# Patient Record
Sex: FEMALE | Race: WHITE | NOT HISPANIC OR LATINO | Employment: UNEMPLOYED | URBAN - METROPOLITAN AREA
[De-identification: names, ages, dates, MRNs, and addresses within clinical notes are randomized per-mention and may not be internally consistent; named-entity substitution may affect disease eponyms.]

---
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
## 2022-04-25 ENCOUNTER — TELEPHONE (OUTPATIENT)
Dept: PEDIATRICS | Facility: CLINIC | Age: 22
End: 2022-04-25
Payer: COMMERCIAL

## 2022-04-25 NOTE — TELEPHONE ENCOUNTER
----- Message from Amandeep Millan LPN sent at 2022 11:16 AM CDT -----  Good morning,    I just spoke with the attached patient and she ended up delivering early. She has an appointment for a meet and greet tomorrow but is needing an appointment for her  for this week-possibly tomorrow if you are able to see the baby. Can someone please call this patient to get her  lined up as soon as possible.    Thank you!

## 2023-04-22 ENCOUNTER — HOSPITAL ENCOUNTER (EMERGENCY)
Facility: HOSPITAL | Age: 23
Discharge: ANOTHER HOSPITAL | End: 2023-04-23
Attending: EMERGENCY MEDICINE
Payer: COMMERCIAL

## 2023-04-22 VITALS
TEMPERATURE: 98 F | SYSTOLIC BLOOD PRESSURE: 133 MMHG | BODY MASS INDEX: 40.67 KG/M2 | RESPIRATION RATE: 16 BRPM | DIASTOLIC BLOOD PRESSURE: 81 MMHG | OXYGEN SATURATION: 97 % | WEIGHT: 252 LBS | HEART RATE: 90 BPM

## 2023-04-22 DIAGNOSIS — K35.30 ACUTE APPENDICITIS WITH LOCALIZED PERITONITIS, WITHOUT PERFORATION, ABSCESS, OR GANGRENE: Primary | ICD-10-CM

## 2023-04-22 LAB
ALBUMIN SERPL BCP-MCNC: 4 G/DL (ref 3.5–5.2)
ALP SERPL-CCNC: 53 U/L (ref 55–135)
ALT SERPL W/O P-5'-P-CCNC: 28 U/L (ref 10–44)
AMORPH CRY UR QL COMP ASSIST: NORMAL
ANION GAP SERPL CALC-SCNC: 11 MMOL/L (ref 8–16)
AST SERPL-CCNC: 16 U/L (ref 10–40)
B-HCG UR QL: NEGATIVE
BACTERIA #/AREA URNS AUTO: NORMAL /HPF
BASOPHILS # BLD AUTO: 0.07 K/UL (ref 0–0.2)
BASOPHILS NFR BLD: 0.5 % (ref 0–1.9)
BILIRUB SERPL-MCNC: 0.2 MG/DL (ref 0.1–1)
BILIRUB UR QL STRIP: NEGATIVE
BUN SERPL-MCNC: 10 MG/DL (ref 6–20)
CALCIUM SERPL-MCNC: 9.7 MG/DL (ref 8.7–10.5)
CHLORIDE SERPL-SCNC: 106 MMOL/L (ref 95–110)
CLARITY UR REFRACT.AUTO: ABNORMAL
CO2 SERPL-SCNC: 24 MMOL/L (ref 23–29)
COLOR UR AUTO: ABNORMAL
CREAT SERPL-MCNC: 0.8 MG/DL (ref 0.5–1.4)
DIFFERENTIAL METHOD: ABNORMAL
EOSINOPHIL # BLD AUTO: 0.1 K/UL (ref 0–0.5)
EOSINOPHIL NFR BLD: 0.9 % (ref 0–8)
ERYTHROCYTE [DISTWIDTH] IN BLOOD BY AUTOMATED COUNT: 12.1 % (ref 11.5–14.5)
EST. GFR  (NO RACE VARIABLE): >60 ML/MIN/1.73 M^2
GLUCOSE SERPL-MCNC: 102 MG/DL (ref 70–110)
GLUCOSE UR QL STRIP: NEGATIVE
HCT VFR BLD AUTO: 40.9 % (ref 37–48.5)
HGB BLD-MCNC: 14 G/DL (ref 12–16)
HGB UR QL STRIP: ABNORMAL
IMM GRANULOCYTES # BLD AUTO: 0.05 K/UL (ref 0–0.04)
IMM GRANULOCYTES NFR BLD AUTO: 0.3 % (ref 0–0.5)
KETONES UR QL STRIP: NEGATIVE
LEUKOCYTE ESTERASE UR QL STRIP: NEGATIVE
LIPASE SERPL-CCNC: 64 U/L (ref 4–60)
LYMPHOCYTES # BLD AUTO: 4.4 K/UL (ref 1–4.8)
LYMPHOCYTES NFR BLD: 29.6 % (ref 18–48)
MCH RBC QN AUTO: 28.8 PG (ref 27–31)
MCHC RBC AUTO-ENTMCNC: 34.2 G/DL (ref 32–36)
MCV RBC AUTO: 84 FL (ref 82–98)
MICROSCOPIC COMMENT: NORMAL
MONOCYTES # BLD AUTO: 1 K/UL (ref 0.3–1)
MONOCYTES NFR BLD: 6.9 % (ref 4–15)
NEUTROPHILS # BLD AUTO: 9.1 K/UL (ref 1.8–7.7)
NEUTROPHILS NFR BLD: 61.8 % (ref 38–73)
NITRITE UR QL STRIP: NEGATIVE
NRBC BLD-RTO: 0 /100 WBC
PH UR STRIP: 8 [PH] (ref 5–8)
PLATELET # BLD AUTO: 337 K/UL (ref 150–450)
PMV BLD AUTO: 9.4 FL (ref 9.2–12.9)
POTASSIUM SERPL-SCNC: 3.6 MMOL/L (ref 3.5–5.1)
PROT SERPL-MCNC: 7 G/DL (ref 6–8.4)
PROT UR QL STRIP: ABNORMAL
RBC # BLD AUTO: 4.86 M/UL (ref 4–5.4)
RBC #/AREA URNS AUTO: 1 /HPF (ref 0–4)
SODIUM SERPL-SCNC: 141 MMOL/L (ref 136–145)
SP GR UR STRIP: 1.01 (ref 1–1.03)
SQUAMOUS #/AREA URNS AUTO: 3 /HPF
URN SPEC COLLECT METH UR: ABNORMAL
UROBILINOGEN UR STRIP-ACNC: NEGATIVE EU/DL
WBC # BLD AUTO: 14.76 K/UL (ref 3.9–12.7)
WBC #/AREA URNS AUTO: 2 /HPF (ref 0–5)

## 2023-04-22 PROCEDURE — 80053 COMPREHEN METABOLIC PANEL: CPT | Mod: ER | Performed by: EMERGENCY MEDICINE

## 2023-04-22 PROCEDURE — 87389 HIV-1 AG W/HIV-1&-2 AB AG IA: CPT | Performed by: EMERGENCY MEDICINE

## 2023-04-22 PROCEDURE — 86803 HEPATITIS C AB TEST: CPT | Performed by: EMERGENCY MEDICINE

## 2023-04-22 PROCEDURE — 85025 COMPLETE CBC W/AUTO DIFF WBC: CPT | Mod: ER | Performed by: EMERGENCY MEDICINE

## 2023-04-22 PROCEDURE — 25000003 PHARM REV CODE 250: Mod: ER | Performed by: EMERGENCY MEDICINE

## 2023-04-22 PROCEDURE — 81000 URINALYSIS NONAUTO W/SCOPE: CPT | Mod: ER | Performed by: EMERGENCY MEDICINE

## 2023-04-22 PROCEDURE — 96361 HYDRATE IV INFUSION ADD-ON: CPT | Mod: ER

## 2023-04-22 PROCEDURE — 99285 EMERGENCY DEPT VISIT HI MDM: CPT | Mod: 25,ER

## 2023-04-22 PROCEDURE — 81025 URINE PREGNANCY TEST: CPT | Mod: ER | Performed by: EMERGENCY MEDICINE

## 2023-04-22 PROCEDURE — 25500020 PHARM REV CODE 255: Mod: ER | Performed by: EMERGENCY MEDICINE

## 2023-04-22 PROCEDURE — 83690 ASSAY OF LIPASE: CPT | Mod: ER | Performed by: EMERGENCY MEDICINE

## 2023-04-22 RX ORDER — MORPHINE SULFATE 4 MG/ML
4 INJECTION, SOLUTION INTRAMUSCULAR; INTRAVENOUS
Status: DISCONTINUED | OUTPATIENT
Start: 2023-04-22 | End: 2023-04-23 | Stop reason: HOSPADM

## 2023-04-22 RX ORDER — ONDANSETRON 2 MG/ML
4 INJECTION INTRAMUSCULAR; INTRAVENOUS
Status: DISCONTINUED | OUTPATIENT
Start: 2023-04-22 | End: 2023-04-23 | Stop reason: HOSPADM

## 2023-04-22 RX ADMIN — IOHEXOL 100 ML: 350 INJECTION, SOLUTION INTRAVENOUS at 09:04

## 2023-04-22 RX ADMIN — SODIUM CHLORIDE 1000 ML: 9 INJECTION, SOLUTION INTRAVENOUS at 08:04

## 2023-04-23 LAB
HCV AB SERPL QL IA: NEGATIVE
HIV 1+2 AB+HIV1 P24 AG SERPL QL IA: NEGATIVE

## 2023-04-23 PROCEDURE — 96374 THER/PROPH/DIAG INJ IV PUSH: CPT | Mod: ER

## 2023-04-23 PROCEDURE — 63600175 PHARM REV CODE 636 W HCPCS: Mod: ER | Performed by: EMERGENCY MEDICINE

## 2023-04-23 RX ORDER — KETOROLAC TROMETHAMINE 30 MG/ML
10 INJECTION, SOLUTION INTRAMUSCULAR; INTRAVENOUS
Status: COMPLETED | OUTPATIENT
Start: 2023-04-23 | End: 2023-04-23

## 2023-04-23 RX ORDER — MORPHINE SULFATE 4 MG/ML
4 INJECTION, SOLUTION INTRAMUSCULAR; INTRAVENOUS
Status: DISCONTINUED | OUTPATIENT
Start: 2023-04-23 | End: 2023-04-23 | Stop reason: HOSPADM

## 2023-04-23 RX ADMIN — KETOROLAC TROMETHAMINE 10 MG: 30 INJECTION, SOLUTION INTRAMUSCULAR; INTRAVENOUS at 12:04

## 2023-04-23 NOTE — ED PROVIDER NOTES
Encounter Date: 2023       History     Chief Complaint   Patient presents with    Abdominal Pain     RLQ pain began this morning. Denies vomiting, diarrhea, fever.      The history is provided by the patient.   Abdominal Pain  The current episode started today. The onset of the illness was gradual. The problem has not changed since onset.The abdominal pain is located in the RLQ. The abdominal pain does not radiate. The severity of the abdominal pain is 6/10. The abdominal pain is relieved by nothing. The other symptoms of the illness do not include fever, fatigue, jaundice, melena, shortness of breath, nausea, vomiting, diarrhea, dysuria, vaginal discharge or vaginal bleeding.   Symptoms associated with the illness do not include back pain.   Review of patient's allergies indicates:   Allergen Reactions    Pcn [penicillins] Anaphylaxis    Metformin      Pt says allergy can be removed    Penicillins     Vyvanse [lisdexamfetamine] Hives     Past Medical History:   Diagnosis Date    Dysmenorrhea     Hypertension     Insulin resistance      Past Surgical History:   Procedure Laterality Date     SECTION      GANGLION CYST EXCISION      TONSILLECTOMY      WISDOM TOOTH EXTRACTION       Family History   Problem Relation Age of Onset    Ulcerative colitis Father     Diabetes Paternal Grandfather     Rheum arthritis Maternal Grandmother     Breast cancer Neg Hx     Colon cancer Neg Hx     Ovarian cancer Neg Hx      Social History     Tobacco Use    Smoking status: Never    Smokeless tobacco: Never   Substance Use Topics    Alcohol use: Not Currently    Drug use: Never     Review of Systems   Constitutional:  Negative for fatigue and fever.   HENT:  Negative for congestion.    Eyes:  Negative for photophobia and visual disturbance.   Respiratory:  Negative for shortness of breath.    Cardiovascular:  Negative for chest pain.   Gastrointestinal:  Positive for abdominal pain. Negative for diarrhea, jaundice, melena,  nausea and vomiting.   Genitourinary:  Negative for dysuria, pelvic pain, vaginal bleeding and vaginal discharge.   Musculoskeletal:  Negative for back pain.   Neurological:  Negative for dizziness.   Hematological:  Does not bruise/bleed easily.     Physical Exam     Initial Vitals [04/22/23 1941]   BP Pulse Resp Temp SpO2   133/81 90 16 98.1 °F (36.7 °C) 97 %      MAP       --         Physical Exam    Nursing note and vitals reviewed.  Constitutional: She appears well-developed and well-nourished. No distress.   HENT:   Head: Normocephalic and atraumatic.   Mouth/Throat: Oropharynx is clear and moist.   Eyes: Conjunctivae and EOM are normal. Pupils are equal, round, and reactive to light.   Neck: Neck supple.   Normal range of motion.  Cardiovascular:  Normal rate, regular rhythm and normal heart sounds.           Pulmonary/Chest: Breath sounds normal. No respiratory distress.   Abdominal: Abdomen is soft. Bowel sounds are normal. She exhibits no distension. There is abdominal tenderness in the right lower quadrant.   Musculoskeletal:         General: Normal range of motion.      Cervical back: Normal range of motion and neck supple.     Neurological: She is alert and oriented to person, place, and time. She has normal strength.   Skin: Skin is warm and dry.   Psychiatric: She has a normal mood and affect. Thought content normal.       ED Course   Procedures  Labs Reviewed   CBC W/ AUTO DIFFERENTIAL - Abnormal; Notable for the following components:       Result Value    WBC 14.76 (*)     Gran # (ANC) 9.1 (*)     Immature Grans (Abs) 0.05 (*)     All other components within normal limits   COMPREHENSIVE METABOLIC PANEL - Abnormal; Notable for the following components:    Alkaline Phosphatase 53 (*)     All other components within normal limits   LIPASE - Abnormal; Notable for the following components:    Lipase 64 (*)     All other components within normal limits   URINALYSIS, REFLEX TO URINE CULTURE - Abnormal;  Notable for the following components:    Appearance, UA Cloudy (*)     Protein, UA Trace (*)     Occult Blood UA Trace (*)     All other components within normal limits    Narrative:     Specimen Source->Urine   PREGNANCY TEST, URINE RAPID    Narrative:     Specimen Source->Urine   URINALYSIS MICROSCOPIC    Narrative:     Specimen Source->Urine   HIV 1 / 2 ANTIBODY   HEPATITIS C ANTIBODY   HEP C VIRUS HOLD SPECIMEN     Results for orders placed or performed during the hospital encounter of 04/22/23   CBC W/ AUTO DIFFERENTIAL   Result Value Ref Range    WBC 14.76 (H) 3.90 - 12.70 K/uL    RBC 4.86 4.00 - 5.40 M/uL    Hemoglobin 14.0 12.0 - 16.0 g/dL    Hematocrit 40.9 37.0 - 48.5 %    MCV 84 82 - 98 fL    MCH 28.8 27.0 - 31.0 pg    MCHC 34.2 32.0 - 36.0 g/dL    RDW 12.1 11.5 - 14.5 %    Platelets 337 150 - 450 K/uL    MPV 9.4 9.2 - 12.9 fL    Immature Granulocytes 0.3 0.0 - 0.5 %    Gran # (ANC) 9.1 (H) 1.8 - 7.7 K/uL    Immature Grans (Abs) 0.05 (H) 0.00 - 0.04 K/uL    Lymph # 4.4 1.0 - 4.8 K/uL    Mono # 1.0 0.3 - 1.0 K/uL    Eos # 0.1 0.0 - 0.5 K/uL    Baso # 0.07 0.00 - 0.20 K/uL    nRBC 0 0 /100 WBC    Gran % 61.8 38.0 - 73.0 %    Lymph % 29.6 18.0 - 48.0 %    Mono % 6.9 4.0 - 15.0 %    Eosinophil % 0.9 0.0 - 8.0 %    Basophil % 0.5 0.0 - 1.9 %    Differential Method Automated    Comp. Metabolic Panel   Result Value Ref Range    Sodium 141 136 - 145 mmol/L    Potassium 3.6 3.5 - 5.1 mmol/L    Chloride 106 95 - 110 mmol/L    CO2 24 23 - 29 mmol/L    Glucose 102 70 - 110 mg/dL    BUN 10 6 - 20 mg/dL    Creatinine 0.8 0.5 - 1.4 mg/dL    Calcium 9.7 8.7 - 10.5 mg/dL    Total Protein 7.0 6.0 - 8.4 g/dL    Albumin 4.0 3.5 - 5.2 g/dL    Total Bilirubin 0.2 0.1 - 1.0 mg/dL    Alkaline Phosphatase 53 (L) 55 - 135 U/L    AST 16 10 - 40 U/L    ALT 28 10 - 44 U/L    Anion Gap 11 8 - 16 mmol/L    eGFR >60.0 >60 mL/min/1.73 m^2   Lipase   Result Value Ref Range    Lipase 64 (H) 4 - 60 U/L   Urinalysis, Reflex to Urine Culture  Urine, Clean Catch    Specimen: Urine   Result Value Ref Range    Specimen UA Urine, Clean Catch     Color, UA Tisha Yellow, Straw, Tisha    Appearance, UA Cloudy (A) Clear    pH, UA 8.0 5.0 - 8.0    Specific Gravity, UA 1.010 1.005 - 1.030    Protein, UA Trace (A) Negative    Glucose, UA Negative Negative    Ketones, UA Negative Negative    Bilirubin (UA) Negative Negative    Occult Blood UA Trace (A) Negative    Nitrite, UA Negative Negative    Urobilinogen, UA Negative <2.0 EU/dL    Leukocytes, UA Negative Negative   Pregnancy, urine rapid   Result Value Ref Range    Preg Test, Ur Negative    Urinalysis Microscopic   Result Value Ref Range    RBC, UA 1 0 - 4 /hpf    WBC, UA 2 0 - 5 /hpf    Bacteria Occasional None-Occ /hpf    Squam Epithel, UA 3 /hpf    Amorphous, UA Moderate None-Moderate    Microscopic Comment SEE COMMENT             Imaging Results               CT Abdomen Pelvis With Contrast (Final result)  Result time 04/22/23 21:28:55      Final result by Lennox Crisostomo MD (04/22/23 21:28:55)                   Impression:      Acute appendicitis.  Surgical consultation advised.    This report was flagged in Epic as abnormal.    All CT scans at this facility are performed  using dose modulation techniques as appropriate to performed exam including the following:  automated exposure control; adjustment of mA and/or kV according to the patients size (this includes techniques or standardized protocols for targeted exams where dose is matched to indication/reason for exam: i.e. extremities or head);  iterative reconstruction technique.      Electronically signed by: Lennox Crisostomo  Date:    04/22/2023  Time:    21:28               Narrative:    EXAMINATION:  CT ABDOMEN PELVIS WITH CONTRAST    CLINICAL HISTORY:  RLQ abdominal pain (Age >= 14y);    TECHNIQUE:  Low dose axial images, sagittal and coronal reformations were obtained from the lung bases to the pubic symphysis.  Contrast was administered.  Images  acquired after the administration 100 mL Omnipaque 350 IV contrast.    COMPARISON:  None    FINDINGS:  Heart: Normal in size. No pericardial effusion.    Lung Bases: Well aerated, without consolidation or pleural fluid.    Liver: Normal in size and attenuation, with no focal hepatic lesions.    Gallbladder: Surgically absent.    Bile Ducts: No evidence of dilated ducts.    Pancreas: No mass or peripancreatic fat stranding.    Spleen: Unremarkable.    Adrenals: Unremarkable.    Kidneys/ Ureters: No hydronephrosis.  No obstructive uropathy.  No nonobstructive nephrolithiasis.    Bladder: No evidence of wall thickening.    Reproductive organs: Unremarkable.    GI Tract/Mesentery: No evidence of bowel obstruction or inflammation.  Acute appendicitis.  Appendiceal dilation wall thickening and local inflammatory change.    Peritoneal Space: No ascites. No free air.    Retroperitoneum: No significant adenopathy.    Abdominal wall: Unremarkable.    Vasculature: No significant atherosclerosis or aneurysm.    Bones: No acute fracture.                                  Pt offered admit to [facility] but requests transfer to [facility]    12:21 AM Discussed lab/imaging studies with patient and the need for further evaluation/admission for appendicitis. Pt verbalized understanding that this is a stand alone ER and we are unable to admit at this facility. Pt will be transferred to [facility] via [facility] with care en route to include Medical transport. I discussed this case with HS and care was accepted by Dr Villatoro.       Medications   morphine injection 4 mg (4 mg Intravenous Not Given 4/22/23 2145)   ondansetron injection 4 mg (4 mg Intravenous Not Given 4/22/23 2145)   sodium chloride 0.9% bolus 1,000 mL 1,000 mL (0 mLs Intravenous Stopped 4/22/23 2142)   iohexoL (OMNIPAQUE 350) injection 100 mL (100 mLs Intravenous Given 4/22/23 2119)     Medical Decision Making:   Initial Assessment:   RLQ abd pain X  1d  Differential Diagnosis:   Appendicitis, Gastroenteritis, Kidney stone, Ovarian cyst  Clinical Tests:   Lab Tests: Ordered and Reviewed  The following lab test(s) were unremarkable: CBC, CMP, Urinalysis and UPT  Radiological Study: Ordered and Reviewed  ED Management:  Pt transferred for surgical evaluation for appendicitis  Other:   I have discussed this case with another health care provider.       <> Summary of the Discussion: Dr Villatoro Surgical consultation accepted pt transfer to [facility] for surgical care                        Clinical Impression:   Final diagnoses:  [K35.30] Acute appendicitis with localized peritonitis, without perforation, abscess, or gangrene (Primary)        ED Disposition Condition    Transfer to Another Facility Stable                Juan Cardona MD  04/23/23 0023

## 2024-06-03 ENCOUNTER — HOSPITAL ENCOUNTER (EMERGENCY)
Facility: HOSPITAL | Age: 24
Discharge: HOME | End: 2024-06-03
Attending: EMERGENCY MEDICINE
Payer: COMMERCIAL

## 2024-06-03 VITALS
DIASTOLIC BLOOD PRESSURE: 74 MMHG | WEIGHT: 202.38 LBS | SYSTOLIC BLOOD PRESSURE: 128 MMHG | HEIGHT: 66 IN | BODY MASS INDEX: 32.53 KG/M2 | TEMPERATURE: 98 F | HEART RATE: 80 BPM | RESPIRATION RATE: 16 BRPM | OXYGEN SATURATION: 99 %

## 2024-06-03 DIAGNOSIS — G43.809 OTHER MIGRAINE WITHOUT STATUS MIGRAINOSUS, NOT INTRACTABLE: Primary | ICD-10-CM

## 2024-06-03 LAB
ALBUMIN SERPL BCP-MCNC: 4.1 G/DL (ref 3.5–5.2)
ALP SERPL-CCNC: 40 U/L (ref 55–135)
ALT SERPL W/O P-5'-P-CCNC: 13 U/L (ref 10–44)
ANION GAP SERPL CALC-SCNC: 11 MMOL/L (ref 8–16)
AST SERPL-CCNC: 12 U/L (ref 10–40)
B-HCG UR QL: NEGATIVE
BACTERIA #/AREA URNS AUTO: NORMAL /HPF
BASOPHILS # BLD AUTO: 0.03 K/UL (ref 0–0.2)
BASOPHILS NFR BLD: 0.3 % (ref 0–1.9)
BILIRUB SERPL-MCNC: 0.4 MG/DL (ref 0.1–1)
BILIRUB UR QL STRIP: NEGATIVE
BUN SERPL-MCNC: 9 MG/DL (ref 6–20)
CALCIUM SERPL-MCNC: 9.3 MG/DL (ref 8.7–10.5)
CHLORIDE SERPL-SCNC: 108 MMOL/L (ref 95–110)
CLARITY UR REFRACT.AUTO: CLEAR
CO2 SERPL-SCNC: 19 MMOL/L (ref 23–29)
COLOR UR AUTO: YELLOW
CREAT SERPL-MCNC: 0.8 MG/DL (ref 0.5–1.4)
DIFFERENTIAL METHOD BLD: NORMAL
EOSINOPHIL # BLD AUTO: 0.4 K/UL (ref 0–0.5)
EOSINOPHIL NFR BLD: 4.1 % (ref 0–8)
ERYTHROCYTE [DISTWIDTH] IN BLOOD BY AUTOMATED COUNT: 12.2 % (ref 11.5–14.5)
EST. GFR  (NO RACE VARIABLE): >60 ML/MIN/1.73 M^2
GLUCOSE SERPL-MCNC: 90 MG/DL (ref 70–110)
GLUCOSE UR QL STRIP: NEGATIVE
HCT VFR BLD AUTO: 42.3 % (ref 37–48.5)
HCV AB SERPL QL IA: NEGATIVE
HEP C VIRUS HOLD SPECIMEN: NORMAL
HGB BLD-MCNC: 14.6 G/DL (ref 12–16)
HGB UR QL STRIP: NEGATIVE
HIV 1+2 AB+HIV1 P24 AG SERPL QL IA: NEGATIVE
IMM GRANULOCYTES # BLD AUTO: 0.03 K/UL (ref 0–0.04)
IMM GRANULOCYTES NFR BLD AUTO: 0.3 % (ref 0–0.5)
KETONES UR QL STRIP: NEGATIVE
LEUKOCYTE ESTERASE UR QL STRIP: ABNORMAL
LYMPHOCYTES # BLD AUTO: 3.3 K/UL (ref 1–4.8)
LYMPHOCYTES NFR BLD: 34.9 % (ref 18–48)
MCH RBC QN AUTO: 29 PG (ref 27–31)
MCHC RBC AUTO-ENTMCNC: 34.5 G/DL (ref 32–36)
MCV RBC AUTO: 84 FL (ref 82–98)
MICROSCOPIC COMMENT: NORMAL
MONOCYTES # BLD AUTO: 0.5 K/UL (ref 0.3–1)
MONOCYTES NFR BLD: 5.2 % (ref 4–15)
NEUTROPHILS # BLD AUTO: 5.3 K/UL (ref 1.8–7.7)
NEUTROPHILS NFR BLD: 55.2 % (ref 38–73)
NITRITE UR QL STRIP: NEGATIVE
NRBC BLD-RTO: 0 /100 WBC
PH UR STRIP: 6 [PH] (ref 5–8)
PLATELET # BLD AUTO: 353 K/UL (ref 150–450)
PMV BLD AUTO: 9.7 FL (ref 9.2–12.9)
POTASSIUM SERPL-SCNC: 4 MMOL/L (ref 3.5–5.1)
PROT SERPL-MCNC: 7.2 G/DL (ref 6–8.4)
PROT UR QL STRIP: NEGATIVE
RBC # BLD AUTO: 5.03 M/UL (ref 4–5.4)
SODIUM SERPL-SCNC: 138 MMOL/L (ref 136–145)
SP GR UR STRIP: 1.02 (ref 1–1.03)
URN SPEC COLLECT METH UR: ABNORMAL
UROBILINOGEN UR STRIP-ACNC: NEGATIVE EU/DL
WBC # BLD AUTO: 9.55 K/UL (ref 3.9–12.7)
WBC #/AREA URNS AUTO: 2 /HPF (ref 0–5)

## 2024-06-03 PROCEDURE — 25000003 PHARM REV CODE 250: Mod: ER | Performed by: EMERGENCY MEDICINE

## 2024-06-03 PROCEDURE — 81000 URINALYSIS NONAUTO W/SCOPE: CPT | Mod: ER | Performed by: EMERGENCY MEDICINE

## 2024-06-03 PROCEDURE — 85025 COMPLETE CBC W/AUTO DIFF WBC: CPT | Mod: ER | Performed by: EMERGENCY MEDICINE

## 2024-06-03 PROCEDURE — 86803 HEPATITIS C AB TEST: CPT | Performed by: EMERGENCY MEDICINE

## 2024-06-03 PROCEDURE — 63600175 PHARM REV CODE 636 W HCPCS: Mod: ER | Performed by: EMERGENCY MEDICINE

## 2024-06-03 PROCEDURE — 96361 HYDRATE IV INFUSION ADD-ON: CPT | Mod: ER

## 2024-06-03 PROCEDURE — 80053 COMPREHEN METABOLIC PANEL: CPT | Mod: ER | Performed by: EMERGENCY MEDICINE

## 2024-06-03 PROCEDURE — 99285 EMERGENCY DEPT VISIT HI MDM: CPT | Mod: 25,ER

## 2024-06-03 PROCEDURE — 81025 URINE PREGNANCY TEST: CPT | Mod: ER | Performed by: EMERGENCY MEDICINE

## 2024-06-03 PROCEDURE — 96374 THER/PROPH/DIAG INJ IV PUSH: CPT | Mod: ER

## 2024-06-03 PROCEDURE — 87389 HIV-1 AG W/HIV-1&-2 AB AG IA: CPT | Performed by: EMERGENCY MEDICINE

## 2024-06-03 RX ORDER — DIPHENHYDRAMINE HYDROCHLORIDE 50 MG/ML
25 INJECTION INTRAMUSCULAR; INTRAVENOUS
Status: COMPLETED | OUTPATIENT
Start: 2024-06-03 | End: 2024-06-03

## 2024-06-03 RX ORDER — BUTALBITAL, ACETAMINOPHEN AND CAFFEINE 50; 325; 40 MG/1; MG/1; MG/1
1 TABLET ORAL EVERY 4 HOURS PRN
Qty: 15 TABLET | Refills: 0 | Status: SHIPPED | OUTPATIENT
Start: 2024-06-03 | End: 2024-07-03

## 2024-06-03 RX ORDER — BUTALBITAL, ACETAMINOPHEN AND CAFFEINE 50; 325; 40 MG/1; MG/1; MG/1
1 TABLET ORAL
Status: COMPLETED | OUTPATIENT
Start: 2024-06-03 | End: 2024-06-03

## 2024-06-03 RX ORDER — PROCHLORPERAZINE EDISYLATE 5 MG/ML
10 INJECTION INTRAMUSCULAR; INTRAVENOUS
Status: DISCONTINUED | OUTPATIENT
Start: 2024-06-03 | End: 2024-06-03 | Stop reason: HOSPADM

## 2024-06-03 RX ADMIN — SODIUM CHLORIDE 1000 ML: 9 INJECTION, SOLUTION INTRAVENOUS at 10:06

## 2024-06-03 RX ADMIN — DIPHENHYDRAMINE HYDROCHLORIDE 25 MG: 50 INJECTION INTRAMUSCULAR; INTRAVENOUS at 10:06

## 2024-06-03 RX ADMIN — BUTALBITAL, ACETAMINOPHEN, AND CAFFEINE 1 TABLET: 325; 50; 40 TABLET ORAL at 09:06

## 2024-06-03 NOTE — ED PROVIDER NOTES
Encounter Date: 6/3/2024       History     Chief Complaint   Patient presents with    Migraine     Light headed when standing.     The history is provided by the patient.   Migraine  This is a new problem. The current episode started 2 days ago. The problem occurs constantly. The problem has not changed since onset.Pertinent negatives include no chest pain, no abdominal pain, no headaches and no shortness of breath. Nothing aggravates the symptoms.     Review of patient's allergies indicates:   Allergen Reactions    Pcn [penicillins] Anaphylaxis    Metformin      Pt says allergy can be removed    Penicillins     Vyvanse [lisdexamfetamine] Hives     Past Medical History:   Diagnosis Date    Dysmenorrhea     Hypertension     Insulin resistance      Past Surgical History:   Procedure Laterality Date    APPENDECTOMY       SECTION      CHOLECYSTECTOMY      GANGLION CYST EXCISION      TONSILLECTOMY      WISDOM TOOTH EXTRACTION       Family History   Problem Relation Name Age of Onset    Ulcerative colitis Father      Diabetes Paternal Grandfather Michell Nuñez     Rheum arthritis Maternal Grandmother Asha Gil     Breast cancer Neg Hx      Colon cancer Neg Hx      Ovarian cancer Neg Hx       Social History     Tobacco Use    Smoking status: Never    Smokeless tobacco: Never   Substance Use Topics    Alcohol use: Not Currently    Drug use: Never     Review of Systems   Constitutional:  Negative for fever.   HENT: Negative.  Negative for congestion and sore throat.    Eyes: Negative.    Respiratory: Negative.  Negative for cough and shortness of breath.    Cardiovascular:  Negative for chest pain.   Gastrointestinal:  Negative for abdominal pain, nausea and vomiting.   Genitourinary:  Negative for dysuria.   Neurological:  Negative for weakness, numbness and headaches.   Psychiatric/Behavioral:  Negative for confusion.        Physical Exam     Initial Vitals [24 0938]   BP Pulse Resp Temp SpO2   121/75  95 19 98 °F (36.7 °C) 99 %      MAP       --         Physical Exam    Constitutional: She appears well-developed and well-nourished. No distress.   HENT:   Head: Normocephalic and atraumatic.   Eyes: Conjunctivae are normal. Pupils are equal, round, and reactive to light.   Neck: Neck supple.   Normal range of motion.  Cardiovascular:  Normal rate, regular rhythm and normal heart sounds.           Pulmonary/Chest: Breath sounds normal.   Abdominal: Abdomen is soft. Bowel sounds are normal. She exhibits no distension. There is no abdominal tenderness. There is no rebound.   Musculoskeletal:         General: No edema. Normal range of motion.      Cervical back: Normal range of motion and neck supple.     Neurological: She is alert and oriented to person, place, and time. She has normal strength.   Skin: Skin is warm and dry.   Psychiatric: She has a normal mood and affect.         ED Course   Procedures  Labs Reviewed   COMPREHENSIVE METABOLIC PANEL - Abnormal; Notable for the following components:       Result Value    CO2 19 (*)     Alkaline Phosphatase 40 (*)     All other components within normal limits    Narrative:     Release to patient->Immediate   URINALYSIS, REFLEX TO URINE CULTURE - Abnormal; Notable for the following components:    Leukocytes, UA Trace (*)     All other components within normal limits    Narrative:     Specimen Source->Urine   CBC W/ AUTO DIFFERENTIAL    Narrative:     Release to patient->Immediate   PREGNANCY TEST, URINE RAPID   URINALYSIS MICROSCOPIC    Narrative:     Specimen Source->Urine   HIV 1 / 2 ANTIBODY   HEPATITIS C ANTIBODY   HEP C VIRUS HOLD SPECIMEN          Imaging Results              CT Head Without Contrast (Final result)  Result time 06/03/24 10:04:39      Final result by Lennox Crisostomo MD (06/03/24 10:04:39)                   Impression:      As above.    All CT scans at this facility are performed  using dose modulation techniques as appropriate to performed exam  including the following:  automated exposure control; adjustment of mA and/or kV according to the patients size (this includes techniques or standardized protocols for targeted exams where dose is matched to indication/reason for exam: i.e. extremities or head);  iterative reconstruction technique.      Electronically signed by: Lennox Crisostomo  Date:    06/03/2024  Time:    10:04               Narrative:    EXAMINATION:  CT HEAD WITHOUT CONTRAST    CLINICAL HISTORY:  Headache, new or worsening, neuro deficit (Age 19-49y);    TECHNIQUE:  Low dose axial CT images obtained throughout the head without intravenous contrast. Sagittal and coronal reconstructions were performed.    COMPARISON:  None.    FINDINGS:  Intracranial compartment:    Ventricles and sulci are normal in size for age without evidence of hydrocephalus. No extra-axial blood or fluid collections.    Hypodensities in the left lentiform nucleus and right internal capsule possibly prominent perivascular spaces.  Correlation with symptoms.  If there is high concern for acute lacunar infarct further evaluation with MRI could be considered, but these are favored to be incidental.    No parenchymal mass, hemorrhage, edema or major vascular distribution infarct.    Skull/extracranial contents (limited evaluation): No fracture. Mastoid air cells and paranasal sinuses are essentially clear.                                       Medications   sodium chloride 0.9% bolus 1,000 mL 1,000 mL (1,000 mLs Intravenous New Bag 6/3/24 1000)   prochlorperazine injection Soln 10 mg (10 mg Intravenous Not Given 6/3/24 0945)   butalbital-acetaminophen-caffeine -40 mg per tablet 1 tablet (1 tablet Oral Given 6/3/24 0958)   diphenhydrAMINE injection 25 mg (25 mg Intravenous Given 6/3/24 1000)     Medical Decision Making  DDx: headache, electrolyte abnormality    Amount and/or Complexity of Data Reviewed  Labs: ordered. Decision-making details documented in ED  Course.  Radiology: ordered. Decision-making details documented in ED Course.  Discussion of management or test interpretation with external provider(s): Feeling better after treatment in the ED, and ready to go home.      Risk  Prescription drug management.                                      Clinical Impression:  Final diagnoses:  [G43.809] Other migraine without status migrainosus, not intractable (Primary)          ED Disposition Condition    Discharge Stable          ED Prescriptions       Medication Sig Dispense Start Date End Date Auth. Provider    butalbital-acetaminophen-caffeine -40 mg (FIORICET, ESGIC) -40 mg per tablet Take 1 tablet by mouth every 4 (four) hours as needed. 15 tablet 6/3/2024 7/3/2024 Lenny Quinn MD          Follow-up Information       Follow up With Specialties Details Why Contact Info    Mason Villareal MD Internal Medicine   [id number] [place] [id number]  [phone]               Lenny Quinn MD  06/03/24 9961